# Patient Record
Sex: MALE | Employment: FULL TIME | URBAN - METROPOLITAN AREA
[De-identification: names, ages, dates, MRNs, and addresses within clinical notes are randomized per-mention and may not be internally consistent; named-entity substitution may affect disease eponyms.]

---

## 2019-02-05 ENCOUNTER — OFFICE VISIT (OUTPATIENT)
Dept: URGENT CARE | Facility: CLINIC | Age: 34
End: 2019-02-05
Payer: COMMERCIAL

## 2019-02-05 VITALS
HEART RATE: 83 BPM | HEIGHT: 70 IN | OXYGEN SATURATION: 97 % | DIASTOLIC BLOOD PRESSURE: 78 MMHG | TEMPERATURE: 96.5 F | BODY MASS INDEX: 27.2 KG/M2 | WEIGHT: 190 LBS | RESPIRATION RATE: 18 BRPM | SYSTOLIC BLOOD PRESSURE: 123 MMHG

## 2019-02-05 DIAGNOSIS — L40.9 PSORIASIS: Primary | ICD-10-CM

## 2019-02-05 DIAGNOSIS — H60.501 ACUTE OTITIS EXTERNA OF RIGHT EAR, UNSPECIFIED TYPE: ICD-10-CM

## 2019-02-05 PROCEDURE — 99203 OFFICE O/P NEW LOW 30 MIN: CPT | Performed by: PHYSICIAN ASSISTANT

## 2019-02-05 RX ORDER — HYDROCORTISONE 25 MG/ML
LOTION TOPICAL 2 TIMES DAILY
Qty: 59 ML | Refills: 0 | Status: SHIPPED | OUTPATIENT
Start: 2019-02-05

## 2019-02-05 NOTE — PROGRESS NOTES
[facility]        NAME: Enrico Montenegro is a 26 y o  male  : 1985    MRN: [id number]  DATE: 2019  TIME: 3:13 PM    Assessment and Plan   Psoriasis [L40 9]  1  Psoriasis  hydrocortisone 2 5 % lotion   2  Acute otitis externa of right ear, unspecified type  neomycin-polymyxin-hydrocortisone (CORTISPORIN) otic solution     Patient Instructions     Take medicine as prescribed  Avoid scratching inside of ear  Follow up with PCP in 3-5 days  Proceed to  ER if symptoms worsen  Chief Complaint     Chief Complaint   Patient presents with   Frankey Height     yesterday Bleeding from Right ear also rash on neck 1 month      History of Present Illness       Earache    There is pain in the right ear  This is a new problem  The current episode started today  The problem occurs constantly  The problem has been gradually worsening  The maximum temperature recorded prior to his arrival was 100 4 - 100 9 F  The pain is moderate  Associated symptoms include a rash  Pertinent negatives include no abdominal pain, coughing, diarrhea, ear discharge, headaches, hearing loss, neck pain, rhinorrhea, sore throat or vomiting  He has tried nothing for the symptoms  There is no history of a chronic ear infection, hearing loss or a tympanostomy tube  Rash   This is a new problem  The current episode started yesterday  The problem has been gradually worsening since onset  The affected locations include the chest  The rash is characterized by dryness, redness and scaling  Pertinent negatives include no anorexia, congestion, cough, diarrhea, eye pain, facial edema, fatigue, fever, joint pain, nail changes, rhinorrhea, shortness of breath, sore throat or vomiting  Past treatments include nothing  The treatment provided moderate relief  There is no history of allergies, asthma, eczema or varicella  Review of Systems   Review of Systems   Constitutional: Negative for fatigue and fever  HENT: Positive for ear pain  Negative for congestion, ear discharge, hearing loss, rhinorrhea and sore throat  Eyes: Negative for pain  Respiratory: Negative for cough and shortness of breath  Gastrointestinal: Negative for abdominal pain, anorexia, diarrhea and vomiting  Musculoskeletal: Negative for joint pain and neck pain  Skin: Positive for rash  Negative for nail changes  Neurological: Negative for headaches  Current Medications       Current Outpatient Prescriptions:     hydrocortisone 2 5 % lotion, Apply topically 2 (two) times a day, Disp: 59 mL, Rfl: 0    neomycin-polymyxin-hydrocortisone (CORTISPORIN) otic solution, Administer 4 drops to the right ear every 8 (eight) hours for 7 days, Disp: 10 mL, Rfl: 0    Current Allergies     Allergies as of 02/05/2019    (No Known Allergies)            The following portions of the patient's history were reviewed and updated as appropriate: allergies, current medications, past family history, past medical history, past social history, past surgical history and problem list      Past Medical History:   Diagnosis Date    No known problems        Past Surgical History:   Procedure Laterality Date    NO PAST SURGERIES         Family History   Problem Relation Age of Onset    No Known Problems Mother     Heart block Father          Medications have been verified  Objective   /78   Pulse 83   Temp (!) 96 5 °F (35 8 °C)   Resp 18   Ht 5' 10" (1 778 m)   Wt 86 2 kg (190 lb)   SpO2 97%   BMI 27 26 kg/m²        Physical Exam     Physical Exam   Constitutional: He appears well-developed  HENT:   Head: Normocephalic  Right Ear: External ear normal  Right ear exhibits lacerations (multiple abrasions to ear canal c/w history of scratching)  Tympanic membrane is not erythematous and not bulging  Left Ear: External ear normal    Nose: No mucosal edema or rhinorrhea  Mouth/Throat: Oropharynx is clear and moist  No oropharyngeal exudate     Cardiovascular: Normal rate, regular rhythm, normal heart sounds and intact distal pulses  Exam reveals no gallop and no friction rub  No murmur heard  Pulmonary/Chest: Effort normal and breath sounds normal  No respiratory distress  He has no wheezes  He has no rales  Abdominal: Soft  Bowel sounds are normal  He exhibits no distension  There is no tenderness  There is no rebound and no guarding     Skin:

## 2020-10-25 ENCOUNTER — OFFICE VISIT (OUTPATIENT)
Dept: URGENT CARE | Facility: CLINIC | Age: 35
End: 2020-10-25
Payer: COMMERCIAL

## 2020-10-25 VITALS
HEIGHT: 69 IN | DIASTOLIC BLOOD PRESSURE: 72 MMHG | BODY MASS INDEX: 26.81 KG/M2 | RESPIRATION RATE: 18 BRPM | WEIGHT: 181 LBS | HEART RATE: 80 BPM | SYSTOLIC BLOOD PRESSURE: 130 MMHG | OXYGEN SATURATION: 98 %

## 2020-10-25 DIAGNOSIS — R51.9 ACUTE NONINTRACTABLE HEADACHE, UNSPECIFIED HEADACHE TYPE: Primary | ICD-10-CM

## 2020-10-25 PROCEDURE — S9083 URGENT CARE CENTER GLOBAL: HCPCS | Performed by: PHYSICIAN ASSISTANT

## 2020-10-25 PROCEDURE — G0382 LEV 3 HOSP TYPE B ED VISIT: HCPCS | Performed by: PHYSICIAN ASSISTANT
